# Patient Record
Sex: FEMALE | Race: WHITE
[De-identification: names, ages, dates, MRNs, and addresses within clinical notes are randomized per-mention and may not be internally consistent; named-entity substitution may affect disease eponyms.]

---

## 2018-09-28 ENCOUNTER — RX RENEWAL (OUTPATIENT)
Age: 78
End: 2018-09-28

## 2018-09-28 PROBLEM — Z00.00 ENCOUNTER FOR PREVENTIVE HEALTH EXAMINATION: Status: ACTIVE | Noted: 2018-09-28

## 2018-09-28 RX ORDER — PEN NEEDLE, DIABETIC 29 G X1/2"
31G X 5 MM NEEDLE, DISPOSABLE MISCELLANEOUS
Qty: 100 | Refills: 2 | Status: ACTIVE | COMMUNITY
Start: 2018-09-28 | End: 1900-01-01

## 2018-10-19 ENCOUNTER — MEDICATION RENEWAL (OUTPATIENT)
Age: 78
End: 2018-10-19

## 2018-10-19 ENCOUNTER — RX RENEWAL (OUTPATIENT)
Age: 78
End: 2018-10-19

## 2018-11-12 ENCOUNTER — RECORD ABSTRACTING (OUTPATIENT)
Age: 78
End: 2018-11-12

## 2018-11-12 DIAGNOSIS — Z86.39 PERSONAL HISTORY OF OTHER ENDOCRINE, NUTRITIONAL AND METABOLIC DISEASE: ICD-10-CM

## 2018-11-12 DIAGNOSIS — Z86.79 PERSONAL HISTORY OF OTHER DISEASES OF THE CIRCULATORY SYSTEM: ICD-10-CM

## 2018-11-12 DIAGNOSIS — Z83.3 FAMILY HISTORY OF DIABETES MELLITUS: ICD-10-CM

## 2018-11-12 DIAGNOSIS — Z80.9 FAMILY HISTORY OF MALIGNANT NEOPLASM, UNSPECIFIED: ICD-10-CM

## 2018-11-21 ENCOUNTER — RECORD ABSTRACTING (OUTPATIENT)
Age: 78
End: 2018-11-21

## 2018-11-26 ENCOUNTER — APPOINTMENT (OUTPATIENT)
Dept: ENDOCRINOLOGY | Facility: CLINIC | Age: 78
End: 2018-11-26
Payer: MEDICARE

## 2018-11-26 ENCOUNTER — RESULT CHARGE (OUTPATIENT)
Age: 78
End: 2018-11-26

## 2018-11-26 VITALS
BODY MASS INDEX: 24.39 KG/M2 | HEART RATE: 91 BPM | HEIGHT: 59 IN | DIASTOLIC BLOOD PRESSURE: 80 MMHG | WEIGHT: 121 LBS | SYSTOLIC BLOOD PRESSURE: 120 MMHG | OXYGEN SATURATION: 97 %

## 2018-11-26 DIAGNOSIS — Z86.79 PERSONAL HISTORY OF OTHER DISEASES OF THE CIRCULATORY SYSTEM: ICD-10-CM

## 2018-11-26 LAB — GLUCOSE BLDC GLUCOMTR-MCNC: 109

## 2018-11-26 PROCEDURE — 82962 GLUCOSE BLOOD TEST: CPT

## 2018-11-26 PROCEDURE — 99214 OFFICE O/P EST MOD 30 MIN: CPT | Mod: 25

## 2018-11-26 RX ORDER — ATORVASTATIN CALCIUM 40 MG/1
40 TABLET, FILM COATED ORAL
Refills: 0 | Status: ACTIVE | COMMUNITY

## 2018-11-26 RX ORDER — FAMOTIDINE 20 MG/1
20 TABLET, FILM COATED ORAL
Refills: 0 | Status: ACTIVE | COMMUNITY

## 2018-11-26 RX ORDER — RIVAROXABAN 20 MG/1
20 TABLET, FILM COATED ORAL
Refills: 0 | Status: ACTIVE | COMMUNITY

## 2018-11-26 RX ORDER — DIGOXIN 125 UG/1
125 TABLET ORAL
Refills: 0 | Status: DISCONTINUED | COMMUNITY
End: 2018-11-26

## 2019-01-21 ENCOUNTER — RX CHANGE (OUTPATIENT)
Age: 79
End: 2019-01-21

## 2019-01-21 RX ORDER — INSULIN GLARGINE 100 [IU]/ML
100 INJECTION, SOLUTION SUBCUTANEOUS
Qty: 1 | Refills: 3 | Status: DISCONTINUED | COMMUNITY
End: 2019-01-21

## 2019-03-08 ENCOUNTER — RX RENEWAL (OUTPATIENT)
Age: 79
End: 2019-03-08

## 2019-03-22 ENCOUNTER — RECORD ABSTRACTING (OUTPATIENT)
Age: 79
End: 2019-03-22

## 2019-03-22 DIAGNOSIS — Z78.9 OTHER SPECIFIED HEALTH STATUS: ICD-10-CM

## 2019-03-25 ENCOUNTER — APPOINTMENT (OUTPATIENT)
Dept: ENDOCRINOLOGY | Facility: CLINIC | Age: 79
End: 2019-03-25
Payer: MEDICARE

## 2019-03-25 VITALS
DIASTOLIC BLOOD PRESSURE: 80 MMHG | HEART RATE: 86 BPM | HEIGHT: 59 IN | BODY MASS INDEX: 25 KG/M2 | SYSTOLIC BLOOD PRESSURE: 120 MMHG | OXYGEN SATURATION: 96 % | WEIGHT: 124 LBS

## 2019-03-25 LAB — GLUCOSE BLDC GLUCOMTR-MCNC: 95

## 2019-03-25 PROCEDURE — 82962 GLUCOSE BLOOD TEST: CPT

## 2019-03-25 PROCEDURE — 99214 OFFICE O/P EST MOD 30 MIN: CPT | Mod: 25

## 2019-03-25 NOTE — REVIEW OF SYSTEMS
[Recent Weight Gain (___ Lbs)] : recent [unfilled] ~Ulb weight gain [Dry Skin] : dry skin [Polydipsia] : polydipsia [Fatigue] : no fatigue [Decreased Appetite] : appetite not decreased [Visual Field Defect] : no visual field defect [Blurry Vision] : no blurred vision [Dysphagia] : no dysphagia [Dysphonia] : no dysphonia [Neck Pain] : no neck pain [Chest Pain] : no chest pain [Palpitations] : no palpitations [SOB on Exertion] : no shortness of breath during exertion [Constipation] : no constipation [Diarrhea] : no diarrhea [Polyuria] : no polyuria [Dysuria] : no dysuria [Hair Loss] : no hair loss [Headache] : no headaches [Tremors] : no tremors [Depression] : no depression [Anxiety] : no anxiety [Cold Intolerance] : cold tolerant [Heat Intolerance] : heat tolerant [Easy Bruising] : no tendency for easy bruising [Swelling] : no swelling [FreeTextEntry5] : hx of afib  [de-identified] : sometimes

## 2019-03-25 NOTE — REASON FOR VISIT
[Follow-Up: _____] : a [unfilled] follow-up visit [FreeTextEntry1] : Type 2 DM, HTN, Hypothyroidism, and Hypercholesterolemia

## 2019-03-25 NOTE — HISTORY OF PRESENT ILLNESS
[FreeTextEntry1] : Pt. reports she recently had an abdomen sonogram for abdominal pain and an artery was found. She follow up with her cardiologist who referred her back to gastroenterology for possible surgery. She has appointment in 2 weeks with the gastroenterologist. \par \par Quality: Type 2 DM\par Severity: moderate \par Duration: 14 years\par Onset: shaking, increase appetite \par Modifying Factors:  Better with medication \par Associated Symptoms:\par \par Current Regimen:\par Glipizide ER 2.5 mg daily \par Basaglar 8 units at HS\par Novolog 2 units before meals if BS > 200 \par \par - Levothyroxine 88 mcg daily - taking appropriately \par Hx of partial thyroidectomy r/t nodules \par \par Self blood sugar monitoring: 3 times a day, no meter, no logs\par per pt. BS has been around 145, sometimes low 85-95 over night \par \par exercise: squats, and walking daily \par \par Diet:\par B- egg, thin bread\par L- tuna fish on 1 slice of rye bread, green tea, \par D- vegetables, chicken, pork, potato \par Snacks -  small apple\par \par Date of last eye exam: 2018 (-) diabetic retinopathy \par Date of last foot exam: 2019\par Date of last flu vaccine: 2018\par Date of last Pneumovax: 2019

## 2019-03-25 NOTE — PHYSICAL EXAM
[Alert] : alert [No Acute Distress] : no acute distress [Well Nourished] : well nourished [Well Developed] : well developed [Normal Sclera/Conjunctiva] : normal sclera/conjunctiva [EOMI] : extra ocular movement intact [Normal Hearing] : hearing was normal [Supple] : the neck was supple [No LAD] : no lymphadenopathy [Thyroid Not Enlarged] : the thyroid was not enlarged [No Thyroid Nodules] : there were no palpable thyroid nodules [Normal Rate and Effort] : normal respiratory rhythm and effort [No Accessory Muscle Use] : no accessory muscle use [Clear to Auscultation] : lungs were clear to auscultation bilaterally [Normal Rate] : heart rate was normal  [Normal S1, S2] : normal S1 and S2 [Regular Rhythm] : with a regular rhythm [Pedal Pulses Normal] : the pedal pulses are present [No Edema] : there was no peripheral edema [Normal Bowel Sounds] : normal bowel sounds [Not Tender] : non-tender [Soft] : abdomen soft [Normal Gait] : normal gait [Acanthosis Nigricans] : no acanthosis nigricans [Right Foot Was Examined] : right foot ~C was examined [Left Foot Was Examined] : left foot ~C was examined [Normal] : normal [Full ROM] : with full range of motion [Diminished Throughout Both Feet] : normal tactile sensation with monofilament testing throughout both feet [No Motor Deficits] : the motor exam was normal [No Tremors] : no tremors [Oriented x3] : oriented to person, place, and time [Normal Insight/Judgement] : insight and judgment were intact [Normal Mood] : the mood was normal [de-identified] : 3 small circular red rash on left shin

## 2019-06-02 ENCOUNTER — RX RENEWAL (OUTPATIENT)
Age: 79
End: 2019-06-02

## 2019-06-24 ENCOUNTER — APPOINTMENT (OUTPATIENT)
Dept: ENDOCRINOLOGY | Facility: CLINIC | Age: 79
End: 2019-06-24
Payer: MEDICARE

## 2019-06-24 VITALS
OXYGEN SATURATION: 97 % | WEIGHT: 127 LBS | HEART RATE: 80 BPM | DIASTOLIC BLOOD PRESSURE: 62 MMHG | BODY MASS INDEX: 25.6 KG/M2 | HEIGHT: 59 IN | SYSTOLIC BLOOD PRESSURE: 112 MMHG

## 2019-06-24 DIAGNOSIS — I10 ESSENTIAL (PRIMARY) HYPERTENSION: ICD-10-CM

## 2019-06-24 LAB
GLUCOSE BLDC GLUCOMTR-MCNC: 82
GLUCOSE BLDC GLUCOMTR-MCNC: 93

## 2019-06-24 PROCEDURE — 82962 GLUCOSE BLOOD TEST: CPT

## 2019-06-24 PROCEDURE — 99214 OFFICE O/P EST MOD 30 MIN: CPT | Mod: 25

## 2019-06-24 RX ORDER — INSULIN GLARGINE 100 [IU]/ML
100 INJECTION, SOLUTION SUBCUTANEOUS
Refills: 0 | Status: DISCONTINUED | COMMUNITY
End: 2019-06-24

## 2019-06-24 RX ORDER — GLIPIZIDE 2.5 MG/1
2.5 TABLET, FILM COATED, EXTENDED RELEASE ORAL DAILY
Refills: 0 | Status: DISCONTINUED | COMMUNITY
End: 2019-06-24

## 2019-06-24 NOTE — REVIEW OF SYSTEMS
[Recent Weight Gain (___ Lbs)] : recent [unfilled] ~Ulb weight gain [Dry Skin] : dry skin [Hair Loss] : hair loss [Depression] : depression [Fatigue] : no fatigue [Decreased Appetite] : appetite not decreased [Visual Field Defect] : no visual field defect [Blurry Vision] : no blurred vision [Dysphonia] : no dysphonia [Dysphagia] : no dysphagia [Neck Pain] : no neck pain [Constipation] : no constipation [Polyuria] : no polyuria [Dysuria] : no dysuria [Headache] : no headaches [Tremors] : no tremors [Anxiety] : no anxiety [Polydipsia] : no polydipsia [Cold Intolerance] : cold tolerant [Swelling] : no swelling [Easy Bruising] : no tendency for easy bruising [Heat Intolerance] : heat tolerant [de-identified] : sometimes

## 2019-06-24 NOTE — PHYSICAL EXAM
[Alert] : alert [No Acute Distress] : no acute distress [Well Nourished] : well nourished [Well Developed] : well developed [Normal Sclera/Conjunctiva] : normal sclera/conjunctiva [EOMI] : extra ocular movement intact [Normal Hearing] : hearing was normal [Supple] : the neck was supple [No LAD] : no lymphadenopathy [Thyroid Not Enlarged] : the thyroid was not enlarged [No Thyroid Nodules] : there were no palpable thyroid nodules [Normal Rate and Effort] : normal respiratory rhythm and effort [No Accessory Muscle Use] : no accessory muscle use [Clear to Auscultation] : lungs were clear to auscultation bilaterally [Normal Rate] : heart rate was normal  [Normal S1, S2] : normal S1 and S2 [Regular Rhythm] : with a regular rhythm [Pedal Pulses Normal] : the pedal pulses are present [No Edema] : there was no peripheral edema [Normal Bowel Sounds] : normal bowel sounds [Not Tender] : non-tender [Soft] : abdomen soft [Normal Gait] : normal gait [Acanthosis Nigricans] : no acanthosis nigricans [Right Foot Was Examined] : right foot ~C was examined [Left Foot Was Examined] : left foot ~C was examined [Normal] : normal [Full ROM] : with full range of motion [Diminished Throughout Both Feet] : normal tactile sensation with monofilament testing throughout both feet [No Tremors] : no tremors [No Motor Deficits] : the motor exam was normal [Oriented x3] : oriented to person, place, and time [Normal Insight/Judgement] : insight and judgment were intact [Normal Mood] : the mood was normal

## 2019-06-24 NOTE — ASSESSMENT
[FreeTextEntry1] : 79 y/o female with Type 2 DM, HTN, Hypothyroidism, and Hypercholesterolemia. Labs reviewed from 6/18/19- , chol 150, TSH 0.72, and A1C 6.9%.\par \par Plan: \par Type 2 DM: decrease Basaglar to 6 units at HS\par - stop Glipizide ER 2.5 mg daily and\par Novolog 2 units before meals if BS > 200\par - continue self BS monitoring\par - if blood sugars continue to be < 100 to call office for medication adjustment\par - educated on healthy food choices\par - encouraged to continue routine exercise\par \par Hypothyroidism: monitor labs, continue Synthroid 88 mcg daily\par \par HTN: stable. continue Enalapril \par \par Hypercholesterolemia: monitor labs, continue Atorvastatin\par \par Labs and follow up visit in 3 months.

## 2019-06-24 NOTE — REASON FOR VISIT
[Follow-Up: _____] : a [unfilled] follow-up visit [FreeTextEntry1] : Type 2 DM, HTN, Hyperthyroidism, and Hypercholesterolemia

## 2019-06-24 NOTE — HISTORY OF PRESENT ILLNESS
[FreeTextEntry1] : 3/25/19 Pt. reports she recently had an abdomen sonogram for abdominal pain and an artery was found. She follow up with her cardiologist who referred her back to gastroenterology for possible surgery. She has appointment in 2 weeks with the gastroenterologist. \par 6/24/19 - C/T scan of abdomen was done last Tuesday, may need surgery r/t abnormal artery in abdomen\par \par Quality: Type 2 DM\par Severity: moderate \par Duration: 14 years\par Onset: shaking, increase appetite \par Modifying Factors:  Better with medication \par Associated Symptoms: neuropathy \par \par Current Regimen:\par Glipizide ER 2.5 mg daily \par Basaglar 8 units at HS\par Novolog 2 units before meals if BS > 200 \par \par - Levothyroxine 88 mcg daily - taking appropriately \par Hx of partial thyroidectomy r/t nodules \par \par Self blood sugar monitoring: 3 times a day, per meter\par random times a day, mostly skips testing before breakfast\par 90, 105, 110, 109, 128, 150, 137, 76, 102\par 7 day average was 117\par recent low blood sugar of 76 corrected with juice \par Today in office blood sugar was 82 corrected with cookies and juice, repeated blood sugar 93\par \par exercise: squats, and walking daily \par \par Diet:\par B- egg, thin bread\par L- tuna fish on 1 slice of rye bread, green tea, \par D- vegetables, chicken, pork, potato \par Snacks -  small apple\par \par Date of last eye exam: 4/2019 (-) diabetic retinopathy \par Date of last foot exam: 2019\par Date of last flu vaccine: 2018\par Date of last Pneumovax: 2019

## 2019-09-24 LAB
GLUCOSE SERPL-MCNC: 139
HBA1C MFR BLD HPLC: 7.5
LDLC SERPL DIRECT ASSAY-MCNC: 81
MICROALBUMIN/CREAT UR-RTO: 13

## 2019-09-25 ENCOUNTER — APPOINTMENT (OUTPATIENT)
Dept: ENDOCRINOLOGY | Facility: CLINIC | Age: 79
End: 2019-09-25
Payer: MEDICARE

## 2019-09-25 LAB — GLUCOSE BLDC GLUCOMTR-MCNC: 112

## 2019-09-25 PROCEDURE — 82962 GLUCOSE BLOOD TEST: CPT

## 2019-09-25 PROCEDURE — 99214 OFFICE O/P EST MOD 30 MIN: CPT | Mod: 25

## 2019-09-25 NOTE — ASSESSMENT
[FreeTextEntry1] : DM type 2, mild loss of control. May need to resume glipizide if pattern continues\par hyperlipidemia stable\par hypothyroid, euthyroid on replacement

## 2019-09-25 NOTE — HISTORY OF PRESENT ILLNESS
[FreeTextEntry1] : Macey is a 77y old Female who presents with a primary complaint of follow up of diabetes, hyperlipidemia, hypertension, hypothyroidism\par Quality:  Type 2.   \par Severity:  Moderate\par Duration:  14 years\par Notes:  Current regimen:\par 	glipizide ER 2.5, 1 daily - stopped\par 	basaglar 8\par 	novolog before meals: (not required as glucose levels do not exceed 200)\par 	200-249	2\par 	250-299	4\par 	300-349	6\par 	> 350       8\par \par abrupt loss of control of diabetes and insulin started 4/23/15\par \par Weight History:  \par weight stable- patient happy with weight\par Feels weight of 111 pounds on 10/25/17 wrong - went to PCP the following week and weight 116, todays weight 118 pounds\par stable weight\par \par Blood Glucose Testing Information \par \par Patient is using the  meter and is testing 3 times per day.\par \par \par Past Medical History\par Hypertension. Cholesterol. 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 \par reflux, ulcer\par atrial fibrillation\par skin cancer (basal cell squamous)\par \par

## 2019-09-25 NOTE — PHYSICAL EXAM
[Thyroid Not Enlarged] : the thyroid was not enlarged [Clear to Auscultation] : lungs were clear to auscultation bilaterally [de-identified] : irregular rhythm

## 2020-02-06 LAB
HBA1C MFR BLD HPLC: 7.3
LDLC SERPL DIRECT ASSAY-MCNC: 72
MICROALBUMIN/CREAT 24H UR-RTO: 24

## 2020-02-07 ENCOUNTER — APPOINTMENT (OUTPATIENT)
Dept: ENDOCRINOLOGY | Facility: CLINIC | Age: 80
End: 2020-02-07
Payer: MEDICARE

## 2020-02-07 VITALS
WEIGHT: 127 LBS | SYSTOLIC BLOOD PRESSURE: 114 MMHG | DIASTOLIC BLOOD PRESSURE: 64 MMHG | BODY MASS INDEX: 25.6 KG/M2 | HEART RATE: 74 BPM | HEIGHT: 59 IN

## 2020-02-07 LAB — GLUCOSE BLDC GLUCOMTR-MCNC: 119

## 2020-02-07 PROCEDURE — 99214 OFFICE O/P EST MOD 30 MIN: CPT | Mod: 25

## 2020-02-07 PROCEDURE — 82962 GLUCOSE BLOOD TEST: CPT

## 2020-02-07 NOTE — HISTORY OF PRESENT ILLNESS
[FreeTextEntry1] : Macey is a 77y old Female who presents with a primary complaint of follow up of diabetes, hyperlipidemia, hypertension, hypothyroidism\par Quality:  Type 2.   \par Severity:  Moderate\par Duration:  15 years\par Notes:  Current regimen:\par 	glipizide ER 2.5, 1 daily - stopped\par 	basaglar 8\par 	novolog before meals: (not required as glucose levels do not exceed 200)\par 	200-249	2\par 	250-299	4\par 	300-349	6\par 	> 350       8\par \par abrupt loss of control of diabetes and insulin started 4/23/15\par \par Weight History:  \par weight stable- patient happy with weight\par Feels weight of 111 pounds on 10/25/17 wrong - went to PCP the following week and weight 116, todays weight 118 pounds\par stable weight\par \par Blood Glucose Testing Information \par \par Patient is using the  meter and is testing 3 times per day.\par \par \par Past Medical History\par Hypertension. Cholesterol. 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 \par reflux, ulcer\par atrial fibrillation\par skin cancer (basal cell squamous)\par ?mesenteric artery ischemia\par \par

## 2020-02-07 NOTE — ASSESSMENT
[FreeTextEntry1] : DM type 2, acceptable control given age and risk of hypoglycemia\par hyperlipidemia stable\par hypothyroid, euthyroid on replacement

## 2020-02-07 NOTE — PHYSICAL EXAM
[Clear to Auscultation] : lungs were clear to auscultation bilaterally [de-identified] : irregular rhythm

## 2020-04-30 ENCOUNTER — RX RENEWAL (OUTPATIENT)
Age: 80
End: 2020-04-30

## 2020-07-09 LAB
HBA1C MFR BLD HPLC: 6.9
LDLC SERPL DIRECT ASSAY-MCNC: 65

## 2020-07-10 ENCOUNTER — APPOINTMENT (OUTPATIENT)
Dept: ENDOCRINOLOGY | Facility: CLINIC | Age: 80
End: 2020-07-10
Payer: MEDICARE

## 2020-07-10 VITALS
SYSTOLIC BLOOD PRESSURE: 124 MMHG | DIASTOLIC BLOOD PRESSURE: 70 MMHG | HEART RATE: 74 BPM | BODY MASS INDEX: 23.18 KG/M2 | HEIGHT: 59 IN | WEIGHT: 115 LBS

## 2020-07-10 LAB — GLUCOSE BLDC GLUCOMTR-MCNC: 99

## 2020-07-10 PROCEDURE — 99214 OFFICE O/P EST MOD 30 MIN: CPT | Mod: 25

## 2020-07-10 PROCEDURE — 82962 GLUCOSE BLOOD TEST: CPT

## 2020-07-10 RX ORDER — BLOOD SUGAR DIAGNOSTIC
STRIP MISCELLANEOUS
Qty: 3 | Refills: 3 | Status: ACTIVE | COMMUNITY
Start: 1900-01-01 | End: 1900-01-01

## 2020-07-10 NOTE — HISTORY OF PRESENT ILLNESS
[FreeTextEntry1] : Macey is a 79y old Female who presents with a primary complaint of follow up of diabetes, hyperlipidemia, hypertension, hypothyroidism\par Quality:  Type 2.   \par Severity:  Moderate\par Duration:  15 years\par Notes:  Current regimen:\par 	glipizide ER 2.5, 1 daily - stopped\par 	basaglar 8\par 	novolog before meals: (not required as glucose levels do not exceed 200)\par 	200-249	2\par 	250-299	4\par 	300-349	6\par 	> 350       8\par \par abrupt loss of control of diabetes and insulin started 4/23/15\par \par Weight History:  \par weight stable- patient happy with weight\par Feels weight of 111 pounds on 10/25/17 wrong - went to PCP the following week and weight 116, todays weight 118 pounds\par stable weight\par \par Blood Glucose Testing Information \par \par Patient is using the  meter and is testing 3 times per day.\par \par \par Past Medical History\par Hypertension. Cholesterol. 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 \par reflux, ulcer\par atrial fibrillation\par skin cancer (basal cell squamous)\par ?mesenteric artery ischemia\par \par

## 2020-07-10 NOTE — PHYSICAL EXAM
[Clear to Auscultation] : lungs were clear to auscultation bilaterally [Thyroid Not Enlarged] : the thyroid was not enlarged [Left Foot Was Examined] : left foot ~C was examined [Regular Rhythm] : with a regular rhythm [Right Foot Was Examined] : right foot ~C was examined

## 2020-07-10 NOTE — ASSESSMENT
[FreeTextEntry1] : DM type 2, well controlled\par hyperlipidemia stable\par hypothyroid, euthyroid on replacement

## 2021-01-13 LAB — HBA1C MFR BLD HPLC: 6.8

## 2021-01-14 ENCOUNTER — APPOINTMENT (OUTPATIENT)
Dept: ENDOCRINOLOGY | Facility: CLINIC | Age: 81
End: 2021-01-14
Payer: MEDICARE

## 2021-01-14 VITALS
HEIGHT: 59 IN | SYSTOLIC BLOOD PRESSURE: 128 MMHG | DIASTOLIC BLOOD PRESSURE: 60 MMHG | OXYGEN SATURATION: 97 % | HEART RATE: 83 BPM | BODY MASS INDEX: 24.8 KG/M2 | WEIGHT: 123 LBS

## 2021-01-14 LAB — GLUCOSE BLDC GLUCOMTR-MCNC: 152

## 2021-01-14 PROCEDURE — 82962 GLUCOSE BLOOD TEST: CPT

## 2021-01-14 PROCEDURE — 99214 OFFICE O/P EST MOD 30 MIN: CPT | Mod: 25

## 2021-01-14 NOTE — HISTORY OF PRESENT ILLNESS
[FreeTextEntry1] : Macey is a 80y old Female who presents with a primary complaint of follow up of diabetes, hyperlipidemia, hypertension, hypothyroidism\par Quality:  Type 2.   \par Severity:  Moderate\par Duration:  16 years\par Notes:  Current regimen:\par 	glipizide ER 2.5, 1 daily - stopped\par 	basaglar 8\par 	novolog before meals: (not required as glucose levels do not exceed 200)\par 	200-249	2\par 	250-299	4\par 	300-349	6\par 	> 350       8\par \par abrupt loss of control of diabetes and insulin started 4/23/15\par \par Weight History:  \par weight stable- patient happy with weight\par Feels weight of 111 pounds on 10/25/17 wrong - went to PCP the following week and weight 116, todays weight 118 pounds\par stable weight\par \par Blood Glucose Testing Information \par \par Patient is using the  meter and is testing 3 times per day.\par \par \par Past Medical History\par Hypertension. Cholesterol. 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 \par reflux, ulcer\par atrial fibrillation\par skin cancer (basal cell squamous)\par ?mesenteric artery ischemia\par \par

## 2021-01-14 NOTE — ASSESSMENT
[FreeTextEntry1] : DM type 2, well controlled\par hyperlipidemia on statin therapy\par hypothyroid, euthyroid on replacement

## 2021-01-14 NOTE — PHYSICAL EXAM
[Thyroid Not Enlarged] : the thyroid was not enlarged [Clear to Auscultation] : lungs were clear to auscultation bilaterally [de-identified] : irregular rhythm

## 2021-05-20 LAB
HBA1C MFR BLD HPLC: 6.9
LDLC SERPL DIRECT ASSAY-MCNC: 67
MICROALBUMIN/CREAT 24H UR-RTO: 20

## 2021-05-21 ENCOUNTER — APPOINTMENT (OUTPATIENT)
Dept: ENDOCRINOLOGY | Facility: CLINIC | Age: 81
End: 2021-05-21
Payer: MEDICARE

## 2021-05-21 VITALS
BODY MASS INDEX: 23.18 KG/M2 | WEIGHT: 115 LBS | HEART RATE: 72 BPM | OXYGEN SATURATION: 99 % | SYSTOLIC BLOOD PRESSURE: 104 MMHG | DIASTOLIC BLOOD PRESSURE: 60 MMHG | HEIGHT: 59 IN

## 2021-05-21 LAB — GLUCOSE BLDC GLUCOMTR-MCNC: 159

## 2021-05-21 PROCEDURE — 82962 GLUCOSE BLOOD TEST: CPT

## 2021-05-21 PROCEDURE — 99214 OFFICE O/P EST MOD 30 MIN: CPT | Mod: 25

## 2021-05-21 NOTE — HISTORY OF PRESENT ILLNESS
[FreeTextEntry1] : Macey is a 80y old Female who presents with a primary complaint of follow up of diabetes, hyperlipidemia, hypertension, hypothyroidism\par Quality:  Type 2.   \par Severity:  Moderate\par Duration:  16 years\par Notes:  Current regimen:\par 	glipizide ER 2.5, 1 daily - stopped\par 	basaglar 8\par 	novolog before meals: (not required as glucose levels do not exceed 200)\par 	200-249	2\par 	250-299	4\par 	300-349	6\par 	> 350       8\par \par abrupt loss of control of diabetes and insulin started 4/23/15\par \par Weight History:  \par weight stable- patient happy with weight\par Feels weight of 111 pounds on 10/25/17 wrong - went to PCP the following week and weight 116, todays weight 118 pounds\par stable weight\par \par Blood Glucose Testing Information \par \par Patient is using the  meter and is testing 3 times per day.\par \par \par Past Medical History\par Hypertension. Cholesterol. 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 \par reflux, ulcer\par atrial fibrillation\par skin cancer (basal cell squamous)\par ?mesenteric artery ischemia\par incidental 2 mm abnormality on MRA, either infundibulum or aneurysm; has neurosurgical consult pending\par

## 2021-09-22 LAB
HBA1C MFR BLD HPLC: 6.8
LDLC SERPL CALC-MCNC: 74
MICROALBUMIN/CREAT 24H UR-RTO: 8

## 2021-09-23 ENCOUNTER — APPOINTMENT (OUTPATIENT)
Dept: ENDOCRINOLOGY | Facility: CLINIC | Age: 81
End: 2021-09-23
Payer: MEDICARE

## 2021-09-23 VITALS
BODY MASS INDEX: 23.39 KG/M2 | OXYGEN SATURATION: 95 % | HEART RATE: 66 BPM | WEIGHT: 116 LBS | SYSTOLIC BLOOD PRESSURE: 112 MMHG | HEIGHT: 59 IN | DIASTOLIC BLOOD PRESSURE: 70 MMHG

## 2021-09-23 LAB — GLUCOSE BLDC GLUCOMTR-MCNC: 99

## 2021-09-23 PROCEDURE — 99214 OFFICE O/P EST MOD 30 MIN: CPT | Mod: 25

## 2021-09-23 PROCEDURE — 82962 GLUCOSE BLOOD TEST: CPT

## 2021-09-23 NOTE — HISTORY OF PRESENT ILLNESS
[FreeTextEntry1] : Macey is a 80y old Female who presents with a primary complaint of follow up of diabetes, hyperlipidemia, hypertension, hypothyroidism\par Quality:  Type 2.   \par Severity:  Moderate\par Duration:  16 years\par Notes:  Current regimen:\par 	glipizide ER 2.5, 1 daily - stopped\par 	basaglar 8\par 	novolog before meals: (not required as glucose levels do not exceed 200)\par 	200-249	2\par 	250-299	4\par 	300-349	6\par 	> 350       8\par \par abrupt loss of control of diabetes and insulin started 4/23/15\par \par Weight History:  \par weight stable- patient happy with weight\par Feels weight of 111 pounds on 10/25/17 wrong - went to PCP the following week and weight 116, todays weight 118 pounds\par stable weight\par \par Blood Glucose Testing Information \par \par Patient is using the  meter and is testing 3 times per day.\par \par \par Past Medical History\par Hypertension. Cholesterol. 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 \par reflux, ulcer\par atrial fibrillation\par skin cancer (basal cell squamous)\par ?mesenteric artery ischemia\par incidental 2 mm abnormality on MRA\par

## 2021-10-06 PROBLEM — I10 ESSENTIAL HYPERTENSION: Status: ACTIVE | Noted: 2019-03-22

## 2022-01-21 LAB
HBA1C MFR BLD HPLC: 6.8
LDLC SERPL DIRECT ASSAY-MCNC: 50
MICROALBUMIN/CREAT 24H UR-RTO: 12

## 2022-01-27 ENCOUNTER — NON-APPOINTMENT (OUTPATIENT)
Age: 82
End: 2022-01-27

## 2022-02-10 ENCOUNTER — RX RENEWAL (OUTPATIENT)
Age: 82
End: 2022-02-10

## 2022-05-06 ENCOUNTER — APPOINTMENT (OUTPATIENT)
Dept: ENDOCRINOLOGY | Facility: CLINIC | Age: 82
End: 2022-05-06
Payer: MEDICARE

## 2022-05-06 VITALS
SYSTOLIC BLOOD PRESSURE: 126 MMHG | DIASTOLIC BLOOD PRESSURE: 76 MMHG | BODY MASS INDEX: 23.99 KG/M2 | HEART RATE: 79 BPM | HEIGHT: 59 IN | WEIGHT: 119 LBS

## 2022-05-06 DIAGNOSIS — E11.9 TYPE 2 DIABETES MELLITUS W/OUT COMPLICATIONS: ICD-10-CM

## 2022-05-06 DIAGNOSIS — E78.00 PURE HYPERCHOLESTEROLEMIA, UNSPECIFIED: ICD-10-CM

## 2022-05-06 DIAGNOSIS — E03.9 HYPOTHYROIDISM, UNSPECIFIED: ICD-10-CM

## 2022-05-06 LAB — GLUCOSE BLDC GLUCOMTR-MCNC: 111

## 2022-05-06 PROCEDURE — 82962 GLUCOSE BLOOD TEST: CPT

## 2022-05-06 PROCEDURE — 99214 OFFICE O/P EST MOD 30 MIN: CPT | Mod: 25

## 2022-05-06 RX ORDER — LEVOTHYROXINE SODIUM 88 UG/1
88 TABLET ORAL
Qty: 90 | Refills: 3 | Status: ACTIVE | COMMUNITY
Start: 2019-03-08 | End: 1900-01-01

## 2022-05-06 RX ORDER — AMLODIPINE BESYLATE 5 MG/1
TABLET ORAL
Refills: 0 | Status: ACTIVE | COMMUNITY

## 2022-05-06 RX ORDER — INSULIN GLARGINE 100 [IU]/ML
100 INJECTION, SOLUTION SUBCUTANEOUS
Qty: 1 | Refills: 3 | Status: ACTIVE | COMMUNITY
Start: 1900-01-01 | End: 1900-01-01

## 2022-05-06 RX ORDER — INSULIN ASPART 100 [IU]/ML
100 INJECTION, SOLUTION INTRAVENOUS; SUBCUTANEOUS
Qty: 1 | Refills: 1 | Status: ACTIVE | COMMUNITY
Start: 2020-02-07 | End: 1900-01-01

## 2022-05-06 RX ORDER — FUROSEMIDE 20 MG/1
20 TABLET ORAL
Refills: 0 | Status: DISCONTINUED | COMMUNITY
End: 2022-05-06

## 2022-05-06 RX ORDER — VALSARTAN 80 MG/1
80 TABLET, COATED ORAL
Refills: 0 | Status: ACTIVE | COMMUNITY

## 2022-05-06 NOTE — HISTORY OF PRESENT ILLNESS
[FreeTextEntry1] : Macey is a 81y old Female who presents with a primary complaint of follow up of diabetes, hyperlipidemia, hypertension, hypothyroidism\par Quality:  Type 2.   \par Severity:  Moderate (MARD)\par Duration:  17 years\par Notes:  Current regimen:\par 	glipizide ER 2.5, 1 daily - stopped\par 	basaglar 8\par 	novolog before meals: (not required as glucose levels do not exceed 200)\par 	200-249	2\par 	250-299	4\par 	300-349	6\par 	> 350       8\par \par abrupt loss of control of diabetes and insulin started 4/23/15\par \par Weight History:  \par weight stable- patient happy with weight\par Feels weight of 111 pounds on 10/25/17 wrong - went to PCP the following week and weight 116, todays weight 118 pounds\par stable weight\par \par Blood Glucose Testing Information \par \par Patient is using the  meter and is testing 3 times per day.\par \par \par Past Medical History\par Hypertension. Cholesterol. 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 \par reflux, ulcer\par atrial fibrillation\par skin cancer (basal cell squamous)\par ?mesenteric artery ischemia\par incidental 2 mm abnormality on MRA\par developed cough on enalapril\par \par Moving to [place] \par \par

## 2022-06-06 NOTE — ASSESSMENT
Glasses Prescription given to patient. [FreeTextEntry1] : DM type 2, controlled\par hyperlipidemia on statin therapy\par hypothyroid, euthyroid on replacement\par \par renew meds and update labs